# Patient Record
Sex: FEMALE | Race: OTHER
[De-identification: names, ages, dates, MRNs, and addresses within clinical notes are randomized per-mention and may not be internally consistent; named-entity substitution may affect disease eponyms.]

---

## 2024-05-13 PROBLEM — Z00.00 ENCOUNTER FOR PREVENTIVE HEALTH EXAMINATION: Status: ACTIVE | Noted: 2024-05-13

## 2024-05-17 ENCOUNTER — APPOINTMENT (OUTPATIENT)
Dept: GYNECOLOGIC ONCOLOGY | Facility: CLINIC | Age: 29
End: 2024-05-17
Payer: COMMERCIAL

## 2024-05-17 VITALS
WEIGHT: 160 LBS | DIASTOLIC BLOOD PRESSURE: 62 MMHG | HEIGHT: 64 IN | BODY MASS INDEX: 27.31 KG/M2 | SYSTOLIC BLOOD PRESSURE: 103 MMHG | HEART RATE: 89 BPM

## 2024-05-17 DIAGNOSIS — Z78.9 OTHER SPECIFIED HEALTH STATUS: ICD-10-CM

## 2024-05-17 PROCEDURE — 99204 OFFICE O/P NEW MOD 45 MIN: CPT

## 2024-05-17 NOTE — HISTORY OF PRESENT ILLNESS
[FreeTextEntry1] : 29-year-old, P-0-0-2-0, LMP 05/05/24. Denies medical history and surgical history.  The patient reports family hx of "womb cancer", maternal grandmother at age 50.  TVS: 02/21/24 The uterus is fixed anteverted retroflexed rotated along its long axis.  It measures 7.7 x 5.8 x 4.6 cm. The myometrium shows normal echostructure. Its cavity is empty. Its endometrial line is regular 8 mm thick with no indentations or masses. Cervix length is 2.9 cm, it demonstrates no abnormalities. Left ovary: It is seen adherent to the lateral wall of the uterus, but mobile along the lateral pelvic wall. It measures 3.5 x 3 x 2.6 cm.  It has a hazy outline with recent history of oocyte retrieval, therefore, assessment of the ovary for the presence of endometriomas is not optimum. Right ovary: It is seen superior but not adherent to the fundus. It measures 3.8 x 3.9 x 2 cm.  It harbors an 19 mm endometrioma. Deep endometriotic nodules: Hypoechogenic areas are noted between the bowel loops and the posterior serosal surface of the uterus to which the bowel is adherent, they correspond to linear endometriotic changes not involving the muscularis propria of the bowels but located between the bowels and the uterus obliterating the pouch of Den. A 2 x 0.7 x 0.5 cm irregular echogenic tissue mass is seen involving the torus Uterius and extending to the left uterosacral ligament mostly due to DIE  Patient presents today for initial evaluation.   She is otherwise concerned about fertility and is requesting further consultation.

## 2024-05-17 NOTE — DISCUSSION/SUMMARY
[Visit Time ___ Minutes] : [unfilled] minutes [Face to Face Time___ Minutes] : with [unfilled] minutes in face to face consultation. [Pre Op] : The differential diagnosis was discussed in detail. The indications, risks, benefits and alternatives were discussed. [unfilled] expressed an understanding of the treatment rationale and her questions were answered to her apparent satisfaction. [Diagnosis/Stage ___] : Given this data, a diagnosis of [unfilled] is rendered. [FreeTextEntry2] : endometriosis Excision.

## 2024-05-17 NOTE — PLAN
[TextEntry] : REFER to Rafaela for fertility work up Refer to [facility] for second opinion and management if needed. (Insurance issues ?) If neither is helpful would consider diagnostic scoping endometriosis ecxcision.

## 2024-05-17 NOTE — PAST MEDICAL HISTORY
[Menstruating] : The patient is menstruating [Total Preg ___] : G[unfilled] [Living ___] : Living: [unfilled] [AB Spont ___] : miscarriages: [unfilled]  [Menarche Age ____] : age at menarche was [unfilled]

## 2024-05-17 NOTE — ASSESSMENT
[FreeTextEntry1] : 29-year-old, P-0-0-2-0, LMP 05/05/24. Denies medical history and surgical history. The patient reports family hx of "womb cancer", maternal grandmother at age 50.  Her work up is c/w endometriossis and she will likely benefit from  a laparoscopic evaluation. However I also informed her that her fertility can better be evaluated and treated by our infertility and endometriosis physicians. She was informed about further fertility evaluation and is referred to Dr. Ashley.

## 2024-05-17 NOTE — PHYSICAL EXAM
[Chaperone Present] : A chaperone was present in the examining room during all aspects of the physical examination [92033] : A chaperone was present during the pelvic exam. [FreeTextEntry2] : Renato Dalton [TextEntry] : Well - developed, well-nourished female in no acute distress HEENT - wnl Breasts - symmetrical w/o masses or nipple discharge Abdomen - soft, non-tender, +BS Back - no CVA tenderness or spinal tenderness Extremities - w/o clubbing, cyanosis, no lesions noted Neuro - AAOx3  Pelvic Exam Vagina - mucosa moist, no lesions noted Cervix - no lesions Uterus -8  wk size, nontender, mobile Adnexa - no palpable masses or tenderness b/l Rectovaginal - confirmatory

## 2024-05-28 ENCOUNTER — NON-APPOINTMENT (OUTPATIENT)
Age: 29
End: 2024-05-28

## 2024-05-28 ENCOUNTER — APPOINTMENT (OUTPATIENT)
Dept: OBGYN | Facility: CLINIC | Age: 29
End: 2024-05-28
Payer: COMMERCIAL

## 2024-05-28 VITALS
HEART RATE: 79 BPM | WEIGHT: 159 LBS | SYSTOLIC BLOOD PRESSURE: 118 MMHG | HEIGHT: 64 IN | DIASTOLIC BLOOD PRESSURE: 82 MMHG | BODY MASS INDEX: 27.14 KG/M2

## 2024-05-28 DIAGNOSIS — Z84.2 FAMILY HISTORY OF OTHER DISEASES OF THE GENITOURINARY SYSTEM: ICD-10-CM

## 2024-05-28 DIAGNOSIS — N80.9 ENDOMETRIOSIS, UNSPECIFIED: ICD-10-CM

## 2024-05-28 DIAGNOSIS — Z78.9 OTHER SPECIFIED HEALTH STATUS: ICD-10-CM

## 2024-05-28 DIAGNOSIS — Z80.49 FAMILY HISTORY OF MALIGNANT NEOPLASM OF OTHER GENITAL ORGANS: ICD-10-CM

## 2024-05-28 LAB
BILIRUB UR QL STRIP: NORMAL
CLARITY UR: CLEAR
COLLECTION METHOD: NORMAL
GLUCOSE UR-MCNC: NORMAL
HCG UR QL: 0.2 EU/DL
HGB UR QL STRIP.AUTO: ABNORMAL
KETONES UR-MCNC: NORMAL
LEUKOCYTE ESTERASE UR QL STRIP: ABNORMAL
NITRITE UR QL STRIP: NORMAL
PH UR STRIP: 5.5
PROT UR STRIP-MCNC: NORMAL
SP GR UR STRIP: 1.02

## 2024-05-28 PROCEDURE — 81003 URINALYSIS AUTO W/O SCOPE: CPT | Mod: QW

## 2024-05-28 PROCEDURE — 99204 OFFICE O/P NEW MOD 45 MIN: CPT | Mod: 25

## 2024-05-28 PROCEDURE — 76830 TRANSVAGINAL US NON-OB: CPT

## 2024-05-28 PROCEDURE — 99459 PELVIC EXAMINATION: CPT

## 2024-05-28 NOTE — PROCEDURE
[Pelvic Pain] : pelvic pain [Abnormal Uterine Bleeding] : abnormal uterine bleeding [Transvaginal Ultrasound] : transvaginal ultrasound [FreeTextEntry3] : NEGATIVE SLIDING SIGN NO FREE FLUID RETROFLEXED [FreeTextEntry5] : 116 CC VOL, 20 MM [FreeTextEntry7] : 7.3 CC  [FreeTextEntry8] : 57 CC VL, ENDOMETRIOMA VS HEMORRHAGIC CYST

## 2024-05-28 NOTE — HISTORY OF PRESENT ILLNESS
[FreeTextEntry1] :  S/P FAILED IVF...THEN MISCARRIAGE AT 5 WEEKS TOLD SHE HAD ENDOMETRIOSIS REFERRED HERE FOR INTERVEWNTION  30 YO   BLEEDING PATTERM: LMP:          MENSTRUAL HISTORY:    X   X    DAYS REGULAR: HEAVY MENSTRUAL BLEEDING: YES CLOTS: YES DYSMENORRHEA:YES DYSPAREUNIA:YES DYSURIA:YES DYSCHEZIA:CRAMPS CPP:?? CYCLIC HEMATOCHEZIA:OCC CYCLIC HEMATURIA:NO PAIN INTENSITY: 10/10 PREVIOUS  ABDOMINOPELVIC SURGERIES:NO PREVIOUS TREATMENTS:IVF....1 FAILED, 1 MC SUBFERTILITY TREATMENTS:YES SUBFERTILITY OUTCOMES:MC [Abnormal Quantity] : abnormal quantity [Heavy Bleeding] : heavy bleeding

## 2024-05-28 NOTE — PHYSICAL EXAM
[Chaperone Present] : A chaperone was present in the examining room during all aspects of the physical examination [[id number]] : A chaperone was present during the pelvic exam. [Examination Of The Breasts] : a normal appearance [Normal] : normal [No Masses] : no breast masses were palpable

## 2024-05-28 NOTE — REVIEW OF SYSTEMS
[Abdominal Pain] : abdominal pain [Abn Vaginal bleeding] : abnormal vaginal bleeding [Pelvic pain] : pelvic pain [Negative] : Gastrointestinal

## 2024-06-26 ENCOUNTER — APPOINTMENT (OUTPATIENT)
Dept: OBGYN | Facility: CLINIC | Age: 29
End: 2024-06-26
Payer: COMMERCIAL

## 2024-06-26 VITALS
DIASTOLIC BLOOD PRESSURE: 81 MMHG | BODY MASS INDEX: 27.49 KG/M2 | HEART RATE: 79 BPM | HEIGHT: 64 IN | WEIGHT: 161 LBS | SYSTOLIC BLOOD PRESSURE: 123 MMHG

## 2024-06-26 DIAGNOSIS — N80.322 DEEP ENDOMETRIOSIS OF THE POSTERIOR CUL-DE-SAC: ICD-10-CM

## 2024-06-26 DIAGNOSIS — N80.129 DEEP ENDOMETRIOSIS OF OVARY, UNSPECIFIED OVARY: ICD-10-CM

## 2024-06-26 DIAGNOSIS — N94.6 DYSMENORRHEA, UNSPECIFIED: ICD-10-CM

## 2024-06-26 DIAGNOSIS — N93.9 ABNORMAL UTERINE AND VAGINAL BLEEDING, UNSPECIFIED: ICD-10-CM

## 2024-06-26 PROCEDURE — 99215 OFFICE O/P EST HI 40 MIN: CPT

## 2024-06-27 PROBLEM — N80.129 ENDOMETRIOMA OF OVARY: Status: ACTIVE | Noted: 2024-06-27

## 2024-06-27 PROBLEM — N80.322: Status: ACTIVE | Noted: 2024-06-27

## 2024-06-27 PROBLEM — N93.9 ABNORMAL UTERINE BLEEDING (AUB): Status: ACTIVE | Noted: 2024-05-28

## 2024-06-27 PROBLEM — N94.6 DYSMENORRHEA: Status: ACTIVE | Noted: 2024-05-28

## 2024-07-02 ENCOUNTER — NON-APPOINTMENT (OUTPATIENT)
Age: 29
End: 2024-07-02

## 2024-07-08 ENCOUNTER — OUTPATIENT (OUTPATIENT)
Dept: OUTPATIENT SERVICES | Facility: HOSPITAL | Age: 29
LOS: 1 days | End: 2024-07-08
Payer: COMMERCIAL

## 2024-07-08 ENCOUNTER — TRANSCRIPTION ENCOUNTER (OUTPATIENT)
Age: 29
End: 2024-07-08

## 2024-07-08 VITALS
HEART RATE: 78 BPM | DIASTOLIC BLOOD PRESSURE: 81 MMHG | TEMPERATURE: 98 F | SYSTOLIC BLOOD PRESSURE: 115 MMHG | WEIGHT: 162.92 LBS | OXYGEN SATURATION: 100 % | HEIGHT: 64 IN | RESPIRATION RATE: 18 BRPM

## 2024-07-08 DIAGNOSIS — Z01.818 ENCOUNTER FOR OTHER PREPROCEDURAL EXAMINATION: ICD-10-CM

## 2024-07-08 DIAGNOSIS — N93.9 ABNORMAL UTERINE AND VAGINAL BLEEDING, UNSPECIFIED: ICD-10-CM

## 2024-07-08 LAB
ALBUMIN SERPL ELPH-MCNC: 4.6 G/DL — SIGNIFICANT CHANGE UP (ref 3.5–5.2)
ALP SERPL-CCNC: 67 U/L — SIGNIFICANT CHANGE UP (ref 30–115)
ALT FLD-CCNC: 20 U/L — SIGNIFICANT CHANGE UP (ref 0–41)
ANION GAP SERPL CALC-SCNC: 12 MMOL/L — SIGNIFICANT CHANGE UP (ref 7–14)
AST SERPL-CCNC: 17 U/L — SIGNIFICANT CHANGE UP (ref 0–41)
BASOPHILS # BLD AUTO: 0.03 K/UL — SIGNIFICANT CHANGE UP (ref 0–0.2)
BASOPHILS NFR BLD AUTO: 0.4 % — SIGNIFICANT CHANGE UP (ref 0–1)
BILIRUB SERPL-MCNC: <0.2 MG/DL — SIGNIFICANT CHANGE UP (ref 0.2–1.2)
BLD GP AB SCN SERPL QL: SIGNIFICANT CHANGE UP
BUN SERPL-MCNC: 11 MG/DL — SIGNIFICANT CHANGE UP (ref 10–20)
CALCIUM SERPL-MCNC: 9.7 MG/DL — SIGNIFICANT CHANGE UP (ref 8.4–10.5)
CHLORIDE SERPL-SCNC: 102 MMOL/L — SIGNIFICANT CHANGE UP (ref 98–110)
CO2 SERPL-SCNC: 25 MMOL/L — SIGNIFICANT CHANGE UP (ref 17–32)
CREAT SERPL-MCNC: 0.7 MG/DL — SIGNIFICANT CHANGE UP (ref 0.7–1.5)
EGFR: 121 ML/MIN/1.73M2 — SIGNIFICANT CHANGE UP
EOSINOPHIL # BLD AUTO: 0.09 K/UL — SIGNIFICANT CHANGE UP (ref 0–0.7)
EOSINOPHIL NFR BLD AUTO: 1.1 % — SIGNIFICANT CHANGE UP (ref 0–8)
GLUCOSE SERPL-MCNC: 88 MG/DL — SIGNIFICANT CHANGE UP (ref 70–99)
HCT VFR BLD CALC: 40.7 % — SIGNIFICANT CHANGE UP (ref 37–47)
HCV AB S/CO SERPL IA: 0.05 COI — SIGNIFICANT CHANGE UP
HCV AB SERPL-IMP: SIGNIFICANT CHANGE UP
HGB BLD-MCNC: 13.1 G/DL — SIGNIFICANT CHANGE UP (ref 12–16)
IMM GRANULOCYTES NFR BLD AUTO: 0.2 % — SIGNIFICANT CHANGE UP (ref 0.1–0.3)
LYMPHOCYTES # BLD AUTO: 3.13 K/UL — SIGNIFICANT CHANGE UP (ref 1.2–3.4)
LYMPHOCYTES # BLD AUTO: 38.4 % — SIGNIFICANT CHANGE UP (ref 20.5–51.1)
MCHC RBC-ENTMCNC: 28.2 PG — SIGNIFICANT CHANGE UP (ref 27–31)
MCHC RBC-ENTMCNC: 32.2 G/DL — SIGNIFICANT CHANGE UP (ref 32–37)
MCV RBC AUTO: 87.7 FL — SIGNIFICANT CHANGE UP (ref 81–99)
MONOCYTES # BLD AUTO: 0.43 K/UL — SIGNIFICANT CHANGE UP (ref 0.1–0.6)
MONOCYTES NFR BLD AUTO: 5.3 % — SIGNIFICANT CHANGE UP (ref 1.7–9.3)
NEUTROPHILS # BLD AUTO: 4.46 K/UL — SIGNIFICANT CHANGE UP (ref 1.4–6.5)
NEUTROPHILS NFR BLD AUTO: 54.6 % — SIGNIFICANT CHANGE UP (ref 42.2–75.2)
NRBC # BLD: 0 /100 WBCS — SIGNIFICANT CHANGE UP (ref 0–0)
PLATELET # BLD AUTO: 336 K/UL — SIGNIFICANT CHANGE UP (ref 130–400)
PMV BLD: 10.2 FL — SIGNIFICANT CHANGE UP (ref 7.4–10.4)
POTASSIUM SERPL-MCNC: 4.4 MMOL/L — SIGNIFICANT CHANGE UP (ref 3.5–5)
POTASSIUM SERPL-SCNC: 4.4 MMOL/L — SIGNIFICANT CHANGE UP (ref 3.5–5)
PROT SERPL-MCNC: 7.3 G/DL — SIGNIFICANT CHANGE UP (ref 6–8)
RBC # BLD: 4.64 M/UL — SIGNIFICANT CHANGE UP (ref 4.2–5.4)
RBC # FLD: 12.6 % — SIGNIFICANT CHANGE UP (ref 11.5–14.5)
SODIUM SERPL-SCNC: 139 MMOL/L — SIGNIFICANT CHANGE UP (ref 135–146)
WBC # BLD: 8.16 K/UL — SIGNIFICANT CHANGE UP (ref 4.8–10.8)
WBC # FLD AUTO: 8.16 K/UL — SIGNIFICANT CHANGE UP (ref 4.8–10.8)

## 2024-07-08 PROCEDURE — 36415 COLL VENOUS BLD VENIPUNCTURE: CPT

## 2024-07-08 PROCEDURE — 86901 BLOOD TYPING SEROLOGIC RH(D): CPT

## 2024-07-08 PROCEDURE — 86803 HEPATITIS C AB TEST: CPT

## 2024-07-08 PROCEDURE — 86850 RBC ANTIBODY SCREEN: CPT

## 2024-07-08 PROCEDURE — 85025 COMPLETE CBC W/AUTO DIFF WBC: CPT

## 2024-07-08 PROCEDURE — 80053 COMPREHEN METABOLIC PANEL: CPT

## 2024-07-08 PROCEDURE — 99214 OFFICE O/P EST MOD 30 MIN: CPT | Mod: 25

## 2024-07-08 PROCEDURE — 86900 BLOOD TYPING SEROLOGIC ABO: CPT

## 2024-07-08 NOTE — H&P PST ADULT - REASON FOR ADMISSION
28 Y/O F , QITH NO PMHX, SCHEDULED FOR PAST FOR PELVIC EXAMINATION UNDER ANESTHESIA, DILATION AND CURETTAGE HYSTEROSCOPY POSSIBLE SYMPHION PROCEDURE, ROBOTIC EXCISION OF DEEP INFILTRATING ENDOMETRIOSIS, BILATERAL URETEROLYSIS, EXCISION OF RIGHT OVARIAN ENDOMETRIOMA UNDER GA WITH DR FONTAINE ON 24. PT REPORTS HISTORY OF ENDOMETRIOSIS SINCE 19 YRS OLD WITH SYMPTOMS WORSENING OVER THE YEARS. PT REPORTS CONSTANT ABDOMINAL PAIN 8/10 ACHING IN NATURE. SHE REPORTS EXTREMELY PAINFUL MENSES ASSOCIATED WITH BACK PAIN RADIATING TO THE LEGS. SHE HAS HAD ONE MISCARRIAGE AND ONE FAILED TRIAL OF IVF. MRI SHOWED: DEEP PELVIC ENDOMETRIOSIS RIGHT ENDOMETRIOMA DEEP INFILTRATING ENDOMETRIOSIS, POSTERIOR UTERUS, OBLITERATING CUL DE SAC.

## 2024-07-08 NOTE — H&P PST ADULT - NSICDXFAMILYHX_GEN_ALL_CORE_FT
FAMILY HISTORY:  Father  Still living? Unknown  Family history of diabetes mellitus (DM), Age at diagnosis: Age Unknown    Grandparent  Still living? Unknown  FH: uterine cancer, Age at diagnosis: Age Unknown

## 2024-07-08 NOTE — H&P PST ADULT - BSA (M2)
[FreeTextEntry1] : 56 yo F with nephrolithiassis\par \par - Given that symptoms have mostly resolved, highly likely that pt has passed her stone\par - Discussed the pros and cons of continued observation vs repeat imaging vs surgical intervention. Decision made to continue observation at this time\par - FU in 3 months
1.79

## 2024-07-08 NOTE — H&P PST ADULT - HISTORY OF PRESENT ILLNESS
PATIENT CURRENTLY DENIES CHEST PAIN  SHORTNESS OF BREATH  PALPITATIONS,  DYSURIA, OR UPPER RESPIRATORY INFECTION IN PAST 2 WEEKS      Denies travel outside the [place] in the past 30 days  Patient denies any signs or symptoms of COVID 19 and denies contact with known positive individuals.         Anesthesia Alert  NO--Difficult Airway  NO--History of neck surgery or radiation  NO--Limited ROM of neck  NO--History of Malignant hyperthermia  NO--No personal or family history of Pseudocholinesterase deficiency.  NO--Prior Anesthesia Complication  NO--Latex Allergy  NO--Loose teeth  NO--History of Rheumatoid Arthritis  NO--Bleeding risk  NO--LACY  NO--Other_____  CLASS II    Duke Activity Status Index (DASI) LIMITED BY ENDOMETRIOSIS PAIN  RESULT SUMMARY:  29.45 points  The higher the score (maximum 58.2), the higher the functional status.    6.36 METs    INPUTS:  Take care of self —> 2.75 = Yes  Walk indoors —> 1.75 = Yes  Walk 1&ndash;2 blocks on level ground —> 2.75 = Yes  Climb a flight of stairs or walk up a hill —> 5.5 = Yes  Run a short distance —> 8 = Yes  Do light work around the house —> 2.7 = Yes  Do moderate work around the house —> 0 = No  Do heavy work around the house —> 0 = No  Do yardwork —> 0 = No  Have sexual relations —> 0 = No  Participate in moderate recreational activities —> 6 = Yes  Participate in strenuous sports —> 0 = No    RCRI 0    PT DENIES ANY RASHES, ABRASION, OR OPEN WOUNDS OR CUTS    AS PER THE PT, THIS IS HIS/HER COMPLETE MEDICAL AND SURGICAL HX, INCLUDING MEDICATIONS PRESCRIBED AND OVER THE COUNTER    Patient/Guardian understands the instructions and was given the opportunity to ask questions and have them answered.    pt denies any suicidal ideation or thoughts, pt states not a threat to self or others

## 2024-07-09 DIAGNOSIS — N93.9 ABNORMAL UTERINE AND VAGINAL BLEEDING, UNSPECIFIED: ICD-10-CM

## 2024-07-09 DIAGNOSIS — Z01.818 ENCOUNTER FOR OTHER PREPROCEDURAL EXAMINATION: ICD-10-CM

## 2024-07-19 ENCOUNTER — APPOINTMENT (OUTPATIENT)
Dept: OBGYN | Facility: HOSPITAL | Age: 29
End: 2024-07-19

## 2024-07-19 ENCOUNTER — TRANSCRIPTION ENCOUNTER (OUTPATIENT)
Age: 29
End: 2024-07-19

## 2024-07-19 ENCOUNTER — OUTPATIENT (OUTPATIENT)
Dept: INPATIENT UNIT | Facility: HOSPITAL | Age: 29
LOS: 1 days | Discharge: HOME | End: 2024-07-19
Payer: COMMERCIAL

## 2024-07-19 ENCOUNTER — RESULT REVIEW (OUTPATIENT)
Age: 29
End: 2024-07-19

## 2024-07-19 VITALS
HEART RATE: 88 BPM | HEIGHT: 64 IN | SYSTOLIC BLOOD PRESSURE: 106 MMHG | DIASTOLIC BLOOD PRESSURE: 64 MMHG | RESPIRATION RATE: 20 BRPM | TEMPERATURE: 98 F | OXYGEN SATURATION: 100 % | WEIGHT: 162.92 LBS

## 2024-07-19 DIAGNOSIS — N94.6 DYSMENORRHEA, UNSPECIFIED: ICD-10-CM

## 2024-07-19 DIAGNOSIS — N93.9 ABNORMAL UTERINE AND VAGINAL BLEEDING, UNSPECIFIED: ICD-10-CM

## 2024-07-19 LAB — ABO RH CONFIRMATION: SIGNIFICANT CHANGE UP

## 2024-07-19 PROCEDURE — 50715 RELEASE OF URETER: CPT | Mod: 50,59

## 2024-07-19 PROCEDURE — 88305 TISSUE EXAM BY PATHOLOGIST: CPT

## 2024-07-19 PROCEDURE — 58662 LAPAROSCOPY EXCISE LESIONS: CPT

## 2024-07-19 PROCEDURE — 88305 TISSUE EXAM BY PATHOLOGIST: CPT | Mod: 26

## 2024-07-19 PROCEDURE — 36415 COLL VENOUS BLD VENIPUNCTURE: CPT

## 2024-07-19 PROCEDURE — S2900: CPT

## 2024-07-19 PROCEDURE — 88304 TISSUE EXAM BY PATHOLOGIST: CPT | Mod: 26

## 2024-07-19 PROCEDURE — C9399: CPT

## 2024-07-19 PROCEDURE — C9290: CPT

## 2024-07-19 PROCEDURE — 58558 HYSTEROSCOPY BIOPSY: CPT

## 2024-07-19 PROCEDURE — 88304 TISSUE EXAM BY PATHOLOGIST: CPT

## 2024-07-19 PROCEDURE — C1889: CPT

## 2024-07-19 RX ORDER — SIMETHICONE 40MG/0.6ML
1 SUSPENSION, DROPS(FINAL DOSAGE FORM)(ML) ORAL
Qty: 28 | Refills: 0
Start: 2024-07-19 | End: 2024-07-25

## 2024-07-19 RX ORDER — ONDANSETRON HYDROCHLORIDE 2 MG/ML
4 INJECTION INTRAMUSCULAR; INTRAVENOUS ONCE
Refills: 0 | Status: COMPLETED | OUTPATIENT
Start: 2024-07-19 | End: 2024-07-19

## 2024-07-19 RX ORDER — SIMETHICONE 40MG/0.6ML
1 SUSPENSION, DROPS(FINAL DOSAGE FORM)(ML) ORAL
Qty: 30 | Refills: 0
Start: 2024-07-19 | End: 2024-07-23

## 2024-07-19 RX ORDER — MORPHINE SULFATE 100 MG/1
4 TABLET, EXTENDED RELEASE ORAL
Refills: 0 | Status: DISCONTINUED | OUTPATIENT
Start: 2024-07-19 | End: 2024-07-20

## 2024-07-19 RX ORDER — DEXTROSE MONOHYDRATE AND SODIUM CHLORIDE 5; .3 G/100ML; G/100ML
500 INJECTION, SOLUTION INTRAVENOUS ONCE
Refills: 0 | Status: COMPLETED | OUTPATIENT
Start: 2024-07-19 | End: 2024-07-19

## 2024-07-19 RX ORDER — ONDANSETRON HYDROCHLORIDE 2 MG/ML
1 INJECTION INTRAMUSCULAR; INTRAVENOUS
Qty: 1 | Refills: 0
Start: 2024-07-19 | End: 2024-07-20

## 2024-07-19 RX ORDER — PROCHLORPERAZINE MALEATE 10 MG/1
5 TABLET, FILM COATED ORAL ONCE
Refills: 0 | Status: COMPLETED | OUTPATIENT
Start: 2024-07-19 | End: 2024-07-19

## 2024-07-19 RX ORDER — DEXTROSE MONOHYDRATE AND SODIUM CHLORIDE 5; .3 G/100ML; G/100ML
1000 INJECTION, SOLUTION INTRAVENOUS
Refills: 0 | Status: DISCONTINUED | OUTPATIENT
Start: 2024-07-19 | End: 2024-07-20

## 2024-07-19 RX ORDER — ACETAMINOPHEN 325 MG
2 TABLET ORAL
Qty: 56 | Refills: 0
Start: 2024-07-19 | End: 2024-07-25

## 2024-07-19 RX ADMIN — ONDANSETRON HYDROCHLORIDE 4 MILLIGRAM(S): 2 INJECTION INTRAMUSCULAR; INTRAVENOUS at 19:56

## 2024-07-19 RX ADMIN — DEXTROSE MONOHYDRATE AND SODIUM CHLORIDE 500 MILLILITER(S): 5; .3 INJECTION, SOLUTION INTRAVENOUS at 19:53

## 2024-07-19 RX ADMIN — MORPHINE SULFATE 4 MILLIGRAM(S): 100 TABLET, EXTENDED RELEASE ORAL at 21:39

## 2024-07-19 RX ADMIN — PROCHLORPERAZINE MALEATE 5 MILLIGRAM(S): 10 TABLET, FILM COATED ORAL at 21:46

## 2024-07-19 RX ADMIN — MORPHINE SULFATE 4 MILLIGRAM(S): 100 TABLET, EXTENDED RELEASE ORAL at 20:37

## 2024-07-19 NOTE — ASU DISCHARGE PLAN (ADULT/PEDIATRIC) - CARE PROVIDER_API CALL
Adrian Penny  Obstetrics and Gynecology  [address] [place] [id number]  Phone: [phone]  Fax: [phone]  Follow Up Time:

## 2024-07-19 NOTE — BRIEF OPERATIVE NOTE - NSICDXBRIEFPROCEDURE_GEN_ALL_CORE_FT
PROCEDURES:  Excision, cyst, ovary, robot-assisted, laparoscopic, or robot-assisted laparoscopic ablation of endometriosis 19-Jul-2024 18:51:58  Rodo Aguilera  Bilateral ureterolysis 19-Jul-2024 18:52:12  Rodo Aguilera  Excision, endometrioma, laparoscopic 19-Jul-2024 18:52:20  Rodo Aguilera   PROCEDURES:  Excision, cyst, ovary, robot-assisted, laparoscopic, or robot-assisted laparoscopic ablation of endometriosis 19-Jul-2024 18:51:58  Rodo Aguilera  Bilateral ureterolysis 19-Jul-2024 18:52:12  Rodo Aguilera  Excision, endometrioma, laparoscopic 19-Jul-2024 18:52:20  Rodo Aguilera  Hysteroscopy with biopsy 19-Jul-2024 19:02:44  Rodo Aguilera

## 2024-07-19 NOTE — BRIEF OPERATIVE NOTE - OPERATION/FINDINGS
Severe endometriosis noted. 3cm left endometrioma. Endometriosis implants on bilateral ovaries which were fulgurated. Normal appearing bilateral fallopian tubes. Bladder flap nodule and endometriosis noted and excised. Bilateral ureterolysis performed. Bilateral periureteral endometriosis excised. Extensive endometriosis in posterior cul de sac with dense rectal adhesions. Gyn oncology intraoperative consult obtained for endometriosis excision in cul de sac and parametria. Dense fibrosis throughout pelvis.  Attempted to do hysteroscopy. Cervix was easily dilated, however due to severe anteversion of uterus, unable to pass hysteroscopy past endocervical can. Uterine cavity not visualized. Gentle D&C done for sampling. Severe endometriosis noted. 3cm left endometrioma. Endometriosis implants on bilateral ovaries which were fulgurated. Normal appearing bilateral fallopian tubes. Bladder flap nodule and endometriosis noted and excised. Bilateral ureterolysis performed. Bilateral periureteral endometriosis excised. Extensive endometriosis in posterior cul de sac with dense rectal adhesions. Gyn oncology intraoperative consult obtained for endometriosis excision in cul de sac and parametria. Dense fibrosis throughout pelvis.

## 2024-07-19 NOTE — CHART NOTE - NSCHARTNOTEFT_GEN_A_CORE
PACU ANESTHESIA ADMISSION NOTE      Procedure: Excision, cyst, ovary, robot-assisted, laparoscopic, or robot-assisted laparoscopic ablation of endometriosis    Bilateral ureterolysis    Excision, endometrioma, laparoscopic    Hysteroscopy with biopsy      Post op diagnosis:  Endometriosis        ____  Intubated  TV:______       Rate: ______      FiO2: ______    __x__  Patent Airway    __x__  Full return of protective reflexes    __x__  Full recovery from anesthesia / back to baseline status    Vitals:  T(C): 37.1 (07-19-24 @ 18:55), Max: 37.1 (07-19-24 @ 18:55)  HR: 93 (07-19-24 @ 20:00) (88 - 104)  BP: 103/62 (07-19-24 @ 20:00) (103/62 - 115/62)  RR: 18 (07-19-24 @ 20:00) (18 - 20)  SpO2: 100% (07-19-24 @ 20:00) (100% - 100%)    Mental Status:  __x__ Awake   ___x__ Alert   _____ Drowsy   _____ Sedated    Nausea/Vomiting:  __x__ NO  ______Yes,   See Post - Op Orders          Pain Scale (0-10):  ___0__    Treatment: ____ None    __x__ See Post - Op/PCA Orders    Post - Operative Fluids:   ____ Oral   __x__ See Post - Op Orders    Plan: Discharge:   __x__Home       _____Floor     _____Critical Care    _____  Other:_________________    Comments: Patient had smooth intraoperative event, no anesthesia complication.  PACU Vital signs: HR:      104     BP:    113    /    63      RR:   16          O2 Sat:   100    %     Temp  98.8  Pt awake and alert in PACU at time of handoff.

## 2024-07-19 NOTE — PRE-ANESTHESIA EVALUATION ADULT - NSATTENDATTESTRD_GEN_ALL_CORE
Low glucose reported to MD, pt not symptomatic, given two boxes of apple juice. MD comfortable discharging pt.   The patient has been re-examined and I agree with the above assessment or I updated with my findings.

## 2024-07-20 VITALS
DIASTOLIC BLOOD PRESSURE: 65 MMHG | SYSTOLIC BLOOD PRESSURE: 105 MMHG | OXYGEN SATURATION: 100 % | RESPIRATION RATE: 20 BRPM | HEART RATE: 86 BPM

## 2024-07-20 NOTE — DISCHARGE NOTE NURSING/CASE MANAGEMENT/SOCIAL WORK - PATIENT PORTAL LINK FT
You can access the FollowMyHealth Patient Portal offered by [facility] by registering at the following website: [email or web address]. By joining [employer] [facility]’s FollowMyHealth portal, you will also be able to view your health information using other applications (apps) compatible with our system.

## 2024-07-20 NOTE — DISCHARGE NOTE NURSING/CASE MANAGEMENT/SOCIAL WORK - NSDCPNINST_GEN_ALL_CORE
hand hygiene. covid precautions. go to ED is soak more than two pads, pain unrelieved by medication, signs of infection (temp greater than 100.4, redness purulent drainage at incision sites), and other abnormalities

## 2024-07-20 NOTE — DISCHARGE NOTE NURSING/CASE MANAGEMENT/SOCIAL WORK - NSDCPEFALRISK_GEN_ALL_CORE
For information on Fall & Injury Prevention, visit: [email or web address] OR  [email or web address] OR  https://www.cdc.gov/steadi/patient.html

## 2024-07-22 ENCOUNTER — NON-APPOINTMENT (OUTPATIENT)
Age: 29
End: 2024-07-22

## 2024-07-23 ENCOUNTER — NON-APPOINTMENT (OUTPATIENT)
Age: 29
End: 2024-07-23

## 2024-07-25 LAB — SURGICAL PATHOLOGY STUDY: SIGNIFICANT CHANGE UP

## 2024-07-27 DIAGNOSIS — N80.329 ENDOMETRIOSIS OF THE POSTERIOR CUL-DE-SAC, UNSPECIFIED DEPTH: ICD-10-CM

## 2024-07-27 DIAGNOSIS — N80.9 ENDOMETRIOSIS, UNSPECIFIED: ICD-10-CM

## 2024-07-27 DIAGNOSIS — N80.102 ENDOMETRIOSIS OF LEFT OVARY, UNSPECIFIED DEPTH: ICD-10-CM

## 2024-07-27 DIAGNOSIS — N80.399 ENDOMETRIOSIS OF THE PELVIC PERITONEUM, OTHER SPECIFIED SITES, UNSPECIFIED DEPTH: ICD-10-CM

## 2024-07-27 DIAGNOSIS — N80.3C1 ENDOMETRIOSIS OF THE RIGHT UTEROSACRAL LIGAMENT, UNSPECIFIED DEPTH: ICD-10-CM

## 2024-07-29 ENCOUNTER — APPOINTMENT (OUTPATIENT)
Dept: OBGYN | Facility: CLINIC | Age: 29
End: 2024-07-29
Payer: COMMERCIAL

## 2024-07-29 VITALS
SYSTOLIC BLOOD PRESSURE: 122 MMHG | BODY MASS INDEX: 27.49 KG/M2 | HEIGHT: 64 IN | WEIGHT: 161 LBS | HEART RATE: 84 BPM | DIASTOLIC BLOOD PRESSURE: 82 MMHG

## 2024-07-29 DIAGNOSIS — Z98.890 OTHER SPECIFIED POSTPROCEDURAL STATES: ICD-10-CM

## 2024-07-29 DIAGNOSIS — N80.322 DEEP ENDOMETRIOSIS OF THE POSTERIOR CUL-DE-SAC: ICD-10-CM

## 2024-07-29 DIAGNOSIS — L53.9 ERYTHEMATOUS CONDITION, UNSPECIFIED: ICD-10-CM

## 2024-07-29 PROCEDURE — 99024 POSTOP FOLLOW-UP VISIT: CPT

## 2024-07-29 RX ORDER — MUPIROCIN 20 MG/G
2 OINTMENT TOPICAL 3 TIMES DAILY
Qty: 1 | Refills: 2 | Status: ACTIVE | COMMUNITY
Start: 2024-07-29 | End: 1900-01-01

## 2024-07-29 RX ORDER — NORETHINDRONE ACETATE 5 MG/1
5 TABLET ORAL
Qty: 90 | Refills: 1 | Status: ACTIVE | COMMUNITY
Start: 2024-07-29 | End: 1900-01-01

## 2024-07-30 NOTE — ASU PATIENT PROFILE, ADULT - ACCEPTABLE
Quality 226: Preventive Care And Screening: Tobacco Use: Screening And Cessation Intervention: Patient screened for tobacco use and is an ex/non-smoker Detail Level: Detailed 5

## 2024-08-26 ENCOUNTER — APPOINTMENT (OUTPATIENT)
Dept: OBGYN | Facility: CLINIC | Age: 29
End: 2024-08-26
Payer: COMMERCIAL

## 2024-08-26 VITALS
WEIGHT: 168 LBS | HEIGHT: 64 IN | SYSTOLIC BLOOD PRESSURE: 132 MMHG | BODY MASS INDEX: 28.68 KG/M2 | DIASTOLIC BLOOD PRESSURE: 82 MMHG | HEART RATE: 88 BPM

## 2024-08-26 DIAGNOSIS — Z98.890 OTHER SPECIFIED POSTPROCEDURAL STATES: ICD-10-CM

## 2024-08-26 DIAGNOSIS — N80.322 DEEP ENDOMETRIOSIS OF THE POSTERIOR CUL-DE-SAC: ICD-10-CM

## 2024-08-26 DIAGNOSIS — N80.9 ENDOMETRIOSIS, UNSPECIFIED: ICD-10-CM

## 2024-08-26 PROCEDURE — 99024 POSTOP FOLLOW-UP VISIT: CPT

## 2024-08-26 PROCEDURE — 76830 TRANSVAGINAL US NON-OB: CPT

## 2024-08-26 RX ORDER — NORETHINDRONE ACETATE 5 MG/1
5 TABLET ORAL
Qty: 180 | Refills: 1 | Status: ACTIVE | COMMUNITY
Start: 2024-08-26 | End: 1900-01-01

## 2024-09-30 ENCOUNTER — APPOINTMENT (OUTPATIENT)
Dept: OBGYN | Facility: CLINIC | Age: 29
End: 2024-09-30

## 2024-10-25 ENCOUNTER — NON-APPOINTMENT (OUTPATIENT)
Age: 29
End: 2024-10-25

## 2025-01-22 NOTE — ASU PREOP CHECKLIST - AS BP NONINV SITE
Continue Regimen: Azelaic acid 15%\\ncephalexin prn for flares only Render In Strict Bullet Format?: No Detail Level: Zone left upper arm

## 2025-02-10 ENCOUNTER — APPOINTMENT (OUTPATIENT)
Dept: OBGYN | Facility: CLINIC | Age: 30
End: 2025-02-10